# Patient Record
Sex: MALE | Race: BLACK OR AFRICAN AMERICAN | NOT HISPANIC OR LATINO | Employment: FULL TIME | URBAN - METROPOLITAN AREA
[De-identification: names, ages, dates, MRNs, and addresses within clinical notes are randomized per-mention and may not be internally consistent; named-entity substitution may affect disease eponyms.]

---

## 2017-01-24 ENCOUNTER — HOSPITAL ENCOUNTER (EMERGENCY)
Facility: OTHER | Age: 57
Discharge: HOME | End: 2017-01-24
Attending: EMERGENCY MEDICINE
Payer: COMMERCIAL

## 2017-01-24 VITALS
OXYGEN SATURATION: 98 % | HEIGHT: 69 IN | RESPIRATION RATE: 16 BRPM | HEART RATE: 89 BPM | DIASTOLIC BLOOD PRESSURE: 97 MMHG | SYSTOLIC BLOOD PRESSURE: 145 MMHG | TEMPERATURE: 98 F | BODY MASS INDEX: 32.58 KG/M2 | WEIGHT: 220 LBS

## 2017-01-24 DIAGNOSIS — B02.9 HERPES ZOSTER WITHOUT COMPLICATION: Primary | ICD-10-CM

## 2017-01-24 PROCEDURE — 99283 EMERGENCY DEPT VISIT LOW MDM: CPT

## 2017-01-24 RX ORDER — ACYCLOVIR 800 MG/1
800 TABLET ORAL
Qty: 25 TABLET | Refills: 0 | Status: SHIPPED | OUTPATIENT
Start: 2017-01-24 | End: 2017-01-29

## 2017-01-24 RX ORDER — HYDROCODONE BITARTRATE AND ACETAMINOPHEN 5; 325 MG/1; MG/1
1 TABLET ORAL EVERY 6 HOURS PRN
Qty: 12 TABLET | Refills: 0 | Status: SHIPPED | OUTPATIENT
Start: 2017-01-24 | End: 2017-02-03

## 2017-01-24 NOTE — ED TRIAGE NOTES
Pt c/o rash since Saturday.  Rash noted to R side of thorax and R side of back.  Described as intermittent sharp pain.

## 2017-01-24 NOTE — ED PROVIDER NOTES
Encounter Date: 1/24/2017       History     Chief Complaint   Patient presents with    Rash     + rash noted to rigth sided of abdomen x several days. Pt reports irritation and pain to site.     Review of patient's allergies indicates:  No Known Allergies  HPI Comments: 56-year-old male with no significant past medical history presents to the emergency department with complaints of rash and pain to the right side of his abdomen and back.  He states he has noticed the rash on Saturday.  He states he initially had itching and pain prior to rash onset.  He states his pain is a 6 out of 10.  No current treatment at home.  He denies fever, chills, drainage, trauma, injury, changes in soaps, detergents, lotions or other associated symptoms.    The history is provided by the patient.     History reviewed. No pertinent past medical history.  No past medical history pertinent negatives.  History reviewed. No pertinent past surgical history.  History reviewed. No pertinent family history.  Social History   Substance Use Topics    Smoking status: Never Smoker    Smokeless tobacco: None    Alcohol use Yes      Comment: rarely     Review of Systems   Constitutional: Negative for chills and fever.   HENT: Negative for sore throat.    Respiratory: Negative for shortness of breath.    Cardiovascular: Negative for chest pain.   Gastrointestinal: Negative for nausea.   Genitourinary: Negative for dysuria.   Musculoskeletal: Negative for back pain.   Skin: Positive for rash.   Neurological: Negative for weakness.   Hematological: Does not bruise/bleed easily.       Physical Exam   Initial Vitals   BP Pulse Resp Temp SpO2   01/24/17 1701 01/24/17 1701 01/24/17 1701 01/24/17 1701 01/24/17 1701   176/107 89 16 97.8 °F (36.6 °C) 98 %     Physical Exam    Nursing note and vitals reviewed.  Constitutional: Vital signs are normal. He appears well-developed and well-nourished.  Non-toxic appearance. No distress.   HENT:   Head:  Normocephalic and atraumatic.   Right Ear: External ear normal.   Left Ear: External ear normal.   Nose: Nose normal.   Mouth/Throat: Oropharynx is clear and moist.   Eyes: Conjunctivae, EOM and lids are normal. Pupils are equal, round, and reactive to light. No scleral icterus.   Neck: Normal range of motion and phonation normal. Neck supple.   Cardiovascular: Normal rate, regular rhythm and normal heart sounds. Exam reveals no gallop and no friction rub.    No murmur heard.  Pulmonary/Chest: Breath sounds normal. No respiratory distress. He has no wheezes. He has no rhonchi. He has no rales.   Abdominal: Soft. Normal appearance and bowel sounds are normal. There is no tenderness. There is no rebound and no guarding.   Musculoskeletal: Normal range of motion.   No obvious deformities, moving all extremities, normal gait   Neurological: He is alert and oriented to person, place, and time. He has normal strength and normal reflexes. No sensory deficit.   Skin: Skin is warm, dry and intact. Rash noted. No lesion noted. No erythema.   Vesicles on erythematous base following T8 dermatome.  Some intact and some ruptured vesicles with overlying scabbing   Psychiatric: He has a normal mood and affect. His speech is normal and behavior is normal. Judgment normal. Cognition and memory are normal.         ED Course   Procedures  Labs Reviewed - No data to display          Medical Decision Making:   History:   Old Medical Records: I decided to obtain old medical records.  Old Records Summarized: other records.       <> Summary of Records: none  Initial Assessment:   56-year-old male with complaints consistent with herpes zoster rash following T8 dermatome.  Afebrile and neurovascularly intact.  Elevated blood pressure with no known history of hypertension.  He is alert, healthy and nontoxic appearing.  Exam is as documented above.  ED Management:  Rash consistent with herpes zoster.  We will discharged home with prescription  for acyclovir and Norco.  He is to follow-up with a primary care physician in the next 48 hours or return for any worsening signs or symptoms.  He states understanding.  He is urged precautions around family members including and children in older adults.  He is urged to have wife inquire about shingles vaccine.  He states understanding.  This patient was discussed with the attending physician who agrees with treatment plan.  Other:   I have discussed this case with another health care provider.       <> Summary of the Discussion: douglas  This note was created using Dragon Medical dictation.  There may be typographical errors secondary to dictation.                     ED Course     Clinical Impression:     1. Herpes zoster without complication          Disposition:   Disposition: Discharged  Condition: Stable       Lennie Fritz PA-C  01/24/17 7861

## 2017-01-24 NOTE — ED AVS SNAPSHOT
[facility]  [address]  [facility] [address]               Tommy Rainey   2017  5:03 PM   ED    Description:  Male : 1960   Department:  [facility]           Your Care was Coordinated By:     Provider Role From To    Joanie Stewart MD Attending Provider 17 --    Lennie Fritz PA-C Physician Assistant 17 --    Tayler Celaya PA-C Physician Assistant 17      Reason for Visit     Rash           Diagnoses this Visit        Comments    Herpes zoster without complication    -  Primary       ED Disposition     None           To Do List           Follow-up Information     Follow up with  [facility] In 2 days.    Contact information:    [address] [facility] [id number] [phone]         These Medications        Disp Refills Start End    acyclovir (ZOVIRAX) 800 MG Tab 25 tablet 0 2017    Take 1 tablet (800 mg total) by mouth 5 (five) times daily. - Oral    hydrocodone-acetaminophen 5-325mg (NORCO) 5-325 mg per tablet 12 tablet 0 2017 2/3/2017    Take 1 tablet by mouth every 6 (six) hours as needed for Pain. - Oral      [facility] On [facility] Care Line -  Assistance  Registered nurses in the [facility] provide clinical advisement, health education, appointment booking, and other advisory services.  Call for this free service at [phone].             Medications           Message regarding Medications     Verify the changes and/or additions to your medication regime listed below are the same as discussed with your clinician today.  If any of these changes or additions are incorrect, please notify your healthcare provider.        START taking these NEW medications        Refills    acyclovir (ZOVIRAX) 800 MG Tab 0    Sig: Take 1 tablet (800 mg total) by mouth 5 (five) times daily.    Class: Print  "   Route: Oral    hydrocodone-acetaminophen 5-325mg (NORCO) 5-325 mg per tablet 0    Sig: Take 1 tablet by mouth every 6 (six) hours as needed for Pain.    Class: Print    Route: Oral           Verify that the below list of medications is an accurate representation of the medications you are currently taking.  If none reported, the list may be blank. If incorrect, please contact your healthcare provider. Carry this list with you in case of emergency.           Current Medications     acyclovir (ZOVIRAX) 800 MG Tab Take 1 tablet (800 mg total) by mouth 5 (five) times daily.    hydrocodone-acetaminophen 5-325mg (NORCO) 5-325 mg per tablet Take 1 tablet by mouth every 6 (six) hours as needed for Pain.           Clinical Reference Information           Your Vitals Were     BP Pulse Temp Resp Height Weight    176/107 (BP Location: Left arm, Patient Position: Sitting) 89 97.8 °F (36.6 °C) (Oral) 16 5' 9" (1.753 m) 99.8 kg (220 lb)    SpO2 BMI             98% 32.49 kg/m2         Allergies as of 1/24/2017     No Known Allergies      Immunizations Administered on Date of Encounter - 1/24/2017     None      ED Micro, Lab, POCT     None      ED Imaging Orders     None        Discharge Instructions         Shingles (Herpes Zoster)  Shingles is also called herpes zoster. It is a painful skin rash caused by the herpes zoster virus. This is the same virus that causes chickenpox. After a person has chickenpox, the virus remains inactive in the nerve cells. Years later, the virus can become active again and travel to the skin. Most people have shingles only once, but it is possible to have it more than once.  What are the risk factors for shingles?  Anyone who has had chickenpox can develop shingles. But your risk is greater if you:  · Are 50 years of age or older.  · Have an illness that weakens your immune system, such as HIV/AIDS.  · Have cancer, especially Hodgkin disease or lymphoma.  · Take medications that weaken your immune " system.  What are the symptoms of shingles?     The shingles rash usually appears on just one side of the body.   · The first sign of shingles is usually pain, burning, tingling, or itching on one part of your face or body. You may also feel as if you have the flu, with fever and chills.  · A red rash with small blisters appears within a few days. The rash may appear as follows:   ¨  The blisters can occur anywhere, but theyre most common on the back, chest, or abdomen.  ¨ They usually appear on only one side of the body, spreading along the nerve pathway where the virus was inactive.   ¨ The rash can also form around an eye, along one side of the face or neck, or in the mouth.  ¨ In a few people, usually those with weakened immune systems, shingles appear on more than one part of the body at once.  · After a few days, the blisters become dry and form a crust. The crust falls off in days to weeks. The blisters generally do not leave scars.  How is shingles treated?  For most people, shingles heals on its own in a few weeks. But treatment is recommended to help relieve pain, speed healing, and reduce the risk of complications. Antiviral medications are prescribed within the first 72 hours of the appearance of the rash. To lessen symptoms:  · Apply ice packs (wrapped in a thin towel), cool compresses,  or soak in a cool bath.  · Use calamine lotion to calm itchy skin.  · Ask your health care provider about over-the-counter pain relievers. If your pain is severe, your provider may prescribe stronger pain medications.  What are the complications of shingles?  Shingles often goes away with no lasting effects. But some people have serious problems long after the blisters have healed:  · Postherpetic neuralgia. This is severe nerve pain that lasts for months, or even years after you have shingles. Medications can be prescribed to help relieve the pain and improve quality of life.  · Bacterial infection. Shingles blisters  may become infected with bacteria. Antibiotic medication is used to treat the infection.  · Eye problems. A person with shingles on the face should see his or her health care provider right away. Shingles can cause serious problems with vision, and even blindness.  When to seek medical care  Contact your health care provider if you experience any of the following:  · Symptoms that dont go away with treatment.  · A rash or blisters near your eye.  · Increased drainage, fever, or rash after treatment, or severe pain that doesnt go away.   How can shingles be prevented?  You can only get shingles if you have had chicken pox in the past. Those who have never had chickenpox can get the virus from you. Although instead of developing shingles, the person may get chickenpox. Until your blisters form scabs, avoid contact with others, especially the following:  · Pregnant women who have never had chickenpox or the vaccine  · Infants who were born early (prematurely) or who had low weight at birth  · People with weak immune system (for example, people receiving chemotherapy for cancer, people who have had organ transplants, or people with HIV infections)     The shingles vaccine  If youre 60 years of age or older , ask your health care provider if you should receive the shingles vaccine. The vaccine makes it less likely that you will develop shingles. If you do develop shingles, your symptoms will likely be milder than if you hadnt been vaccinated. Note: Although the vaccine is licensed for people 50 years of age or older, the CDC does not recommend the vaccine for those who are 50 to 59 years old.   © 9357-1107 [employer]. [address] [place] [address], [place] [address]. All rights reserved. This information is not intended as a substitute for professional medical care. Always follow your healthcare professional's instructions.          [facility] Sign-Up     Activating your [facility] account is as easy as 1-2-3!      1) Visit [email or web address], select Sign Up Now, enter this activation code and your date of birth, then select Next.  [email or web address]  Expires: 3/10/2017  5:15 PM      2) Create a username and password to use when you visit [facility] in the future and select a security question in case you lose your password and select Next.    3) Enter your e-mail address and click Sign Up!    Additional Information  If you have questions, please e-mail [email or web address] or call [phone] to talk to our [employer] staff. Remember, [facility] is NOT to be used for urgent needs. For medical emergencies, dial 911.          [facility] complies with applicable Federal civil rights laws and does not discriminate on the basis of race, color, national origin, age, disability, or sex.        Language Assistance Services     ATTENTION: Language assistance services are available, free of charge. Please call [phone].      ATENCIÓN: Si habla español, tiene a liu disposición servicios gratuitos de asistencia lingüística. Llame al [phone].     ISHA Ý: N?u b?n nói Ti?ng Vi?t, có các d?ch v? h? tr? ngôn ng? mi?n phí dành cho b?n. G?i s? [phone].

## 2017-01-24 NOTE — DISCHARGE INSTRUCTIONS
Shingles (Herpes Zoster)  Shingles is also called herpes zoster. It is a painful skin rash caused by the herpes zoster virus. This is the same virus that causes chickenpox. After a person has chickenpox, the virus remains inactive in the nerve cells. Years later, the virus can become active again and travel to the skin. Most people have shingles only once, but it is possible to have it more than once.  What are the risk factors for shingles?  Anyone who has had chickenpox can develop shingles. But your risk is greater if you:  · Are 50 years of age or older.  · Have an illness that weakens your immune system, such as HIV/AIDS.  · Have cancer, especially Hodgkin disease or lymphoma.  · Take medications that weaken your immune system.  What are the symptoms of shingles?     The shingles rash usually appears on just one side of the body.   · The first sign of shingles is usually pain, burning, tingling, or itching on one part of your face or body. You may also feel as if you have the flu, with fever and chills.  · A red rash with small blisters appears within a few days. The rash may appear as follows:   ¨  The blisters can occur anywhere, but theyre most common on the back, chest, or abdomen.  ¨ They usually appear on only one side of the body, spreading along the nerve pathway where the virus was inactive.   ¨ The rash can also form around an eye, along one side of the face or neck, or in the mouth.  ¨ In a few people, usually those with weakened immune systems, shingles appear on more than one part of the body at once.  · After a few days, the blisters become dry and form a crust. The crust falls off in days to weeks. The blisters generally do not leave scars.  How is shingles treated?  For most people, shingles heals on its own in a few weeks. But treatment is recommended to help relieve pain, speed healing, and reduce the risk of complications. Antiviral medications are prescribed within the first 72 hours of  the appearance of the rash. To lessen symptoms:  · Apply ice packs (wrapped in a thin towel), cool compresses,  or soak in a cool bath.  · Use calamine lotion to calm itchy skin.  · Ask your health care provider about over-the-counter pain relievers. If your pain is severe, your provider may prescribe stronger pain medications.  What are the complications of shingles?  Shingles often goes away with no lasting effects. But some people have serious problems long after the blisters have healed:  · Postherpetic neuralgia. This is severe nerve pain that lasts for months, or even years after you have shingles. Medications can be prescribed to help relieve the pain and improve quality of life.  · Bacterial infection. Shingles blisters may become infected with bacteria. Antibiotic medication is used to treat the infection.  · Eye problems. A person with shingles on the face should see his or her health care provider right away. Shingles can cause serious problems with vision, and even blindness.  When to seek medical care  Contact your health care provider if you experience any of the following:  · Symptoms that dont go away with treatment.  · A rash or blisters near your eye.  · Increased drainage, fever, or rash after treatment, or severe pain that doesnt go away.   How can shingles be prevented?  You can only get shingles if you have had chicken pox in the past. Those who have never had chickenpox can get the virus from you. Although instead of developing shingles, the person may get chickenpox. Until your blisters form scabs, avoid contact with others, especially the following:  · Pregnant women who have never had chickenpox or the vaccine  · Infants who were born early (prematurely) or who had low weight at birth  · People with weak immune system (for example, people receiving chemotherapy for cancer, people who have had organ transplants, or people with HIV infections)     The shingles vaccine  If youre 60 years of  age or older , ask your health care provider if you should receive the shingles vaccine. The vaccine makes it less likely that you will develop shingles. If you do develop shingles, your symptoms will likely be milder than if you hadnt been vaccinated. Note: Although the vaccine is licensed for people 50 years of age or older, the CDC does not recommend the vaccine for those who are 50 to 59 years old.   © 3382-6836 [employer]. [address] [place] [address], [place] [address]. All rights reserved. This information is not intended as a substitute for professional medical care. Always follow your healthcare professional's instructions.